# Patient Record
Sex: FEMALE | Race: WHITE | NOT HISPANIC OR LATINO | Employment: UNEMPLOYED | RURAL
[De-identification: names, ages, dates, MRNs, and addresses within clinical notes are randomized per-mention and may not be internally consistent; named-entity substitution may affect disease eponyms.]

---
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
## 2024-01-09 ENCOUNTER — TELEPHONE (OUTPATIENT)
Dept: FAMILY MEDICINE CLINIC | Facility: CLINIC | Age: 19
End: 2024-01-09
Payer: COMMERCIAL

## 2024-01-09 RX ORDER — AZITHROMYCIN 250 MG/1
TABLET, FILM COATED ORAL
Qty: 6 TABLET | Refills: 0 | Status: SHIPPED | OUTPATIENT
Start: 2024-01-09

## 2024-01-23 ENCOUNTER — TELEPHONE (OUTPATIENT)
Dept: FAMILY MEDICINE CLINIC | Facility: CLINIC | Age: 19
End: 2024-01-23
Payer: COMMERCIAL

## 2024-01-23 NOTE — TELEPHONE ENCOUNTER
Caller: Rachana Wolfe    Relationship to patient: Mother    Best call back number:  [phone]     Chief complaint: [facility] [employer]    Type of visit: NURSE/MA

## 2024-01-24 DIAGNOSIS — Z00.00 WELLNESS EXAMINATION: Primary | ICD-10-CM

## 2024-01-25 LAB
AMPHETAMINES UR QL SCN: NEGATIVE NG/ML
BARBITURATES UR QL SCN: NEGATIVE NG/ML
BENZODIAZ UR QL SCN: NEGATIVE NG/ML
BZE UR QL SCN: NEGATIVE NG/ML
CANNABINOIDS UR QL SCN: NEGATIVE NG/ML
CREAT UR-MCNC: 88.6 MG/DL (ref 20–300)
LABORATORY COMMENT REPORT: NORMAL
METHADONE UR QL SCN: NEGATIVE NG/ML
OPIATES UR QL SCN: NEGATIVE NG/ML
OXYCODONE+OXYMORPHONE UR QL SCN: NEGATIVE NG/ML
PCP UR QL: NEGATIVE NG/ML
PH UR: 7.8 [PH] (ref 4.5–8.9)
PROPOXYPH UR QL SCN: NEGATIVE NG/ML

## 2024-02-12 ENCOUNTER — TELEPHONE (OUTPATIENT)
Dept: NEUROLOGY | Facility: HOSPITAL | Age: 19
End: 2024-02-12

## 2024-02-13 ENCOUNTER — HOSPITAL ENCOUNTER (OUTPATIENT)
Dept: NEUROLOGY | Facility: HOSPITAL | Age: 19
Discharge: HOME | End: 2024-02-13
Admitting: FAMILY MEDICINE
Payer: COMMERCIAL

## 2024-02-13 DIAGNOSIS — R20.0 LEG NUMBNESS: ICD-10-CM

## 2024-02-13 PROCEDURE — 95911 NRV CNDJ TEST 9-10 STUDIES: CPT

## 2024-02-13 PROCEDURE — 95886 MUSC TEST DONE W/N TEST COMP: CPT

## 2024-03-22 ENCOUNTER — TELEPHONE (OUTPATIENT)
Dept: FAMILY MEDICINE CLINIC | Facility: CLINIC | Age: 19
End: 2024-03-22

## 2024-03-22 RX ORDER — AMOXICILLIN AND CLAVULANATE POTASSIUM 875; 125 MG/1; MG/1
1 TABLET, FILM COATED ORAL 2 TIMES DAILY
Qty: 20 TABLET | Refills: 0 | Status: SHIPPED | OUTPATIENT
Start: 2024-03-22 | End: 2024-04-01

## 2024-03-22 NOTE — TELEPHONE ENCOUNTER
Caller: Rachana Wolfe    Relationship: Mother    Best call back number: [phone]    What medication are you requesting:     PCP RECOMMENDATION    What are your current symptoms:     LITTLE COUGH MOSTLY FROM DRAINAGE, GREEN DRAINAGE, SINUS HEADACHE, EYES BLOOD SHOT    OTC NOT WORKING    How long have you been experiencing symptoms:     5 DAYS    Have you had these symptoms before:    [x] Yes  [] No    Have you been treated for these symptoms before:   [x] Yes  [] No    If a prescription is needed, what is your preferred pharmacy and phone number:      [facility] - [place] - [address] [facility] [phone] [facility] [phone] FX

## 2024-09-26 ENCOUNTER — TELEPHONE (OUTPATIENT)
Dept: FAMILY MEDICINE CLINIC | Facility: CLINIC | Age: 19
End: 2024-09-26
Payer: COMMERCIAL

## 2024-09-26 RX ORDER — SERTRALINE HYDROCHLORIDE 25 MG/1
25 TABLET, FILM COATED ORAL DAILY
Qty: 30 TABLET | Refills: 0 | Status: SHIPPED | OUTPATIENT
Start: 2024-09-26

## 2024-10-09 ENCOUNTER — OFFICE VISIT (OUTPATIENT)
Dept: FAMILY MEDICINE CLINIC | Facility: CLINIC | Age: 19
End: 2024-10-09
Payer: COMMERCIAL

## 2024-10-09 VITALS
DIASTOLIC BLOOD PRESSURE: 68 MMHG | OXYGEN SATURATION: 98 % | WEIGHT: 194.8 LBS | HEIGHT: 63 IN | SYSTOLIC BLOOD PRESSURE: 100 MMHG | BODY MASS INDEX: 34.52 KG/M2 | TEMPERATURE: 97.6 F | HEART RATE: 72 BPM

## 2024-10-09 DIAGNOSIS — Z00.00 WELLNESS EXAMINATION: ICD-10-CM

## 2024-10-09 DIAGNOSIS — F33.0 MILD EPISODE OF RECURRENT MAJOR DEPRESSIVE DISORDER: Primary | ICD-10-CM

## 2024-10-09 RX ORDER — ESCITALOPRAM OXALATE 10 MG/1
10 TABLET ORAL DAILY
Qty: 30 TABLET | Refills: 5 | Status: SHIPPED | OUTPATIENT
Start: 2024-10-09

## 2024-10-09 NOTE — PROGRESS NOTES
"Chief Complaint  Med Refill (zoloft)    Subjective      Safia Cao presents to [facility]  HPI: Patient is a 18 y.o. female who is here today with complaint of depression/anxiety, reports depression is worse than anxiety. Was prescribed Zoloft and experienced dizziness, nausea, indigestion. She did take the medication for 30 days and side effects did not resolve. Also needs simple physical for work, she is a .     Objective   Vital Signs:  /68 (BP Location: Left arm, Patient Position: Sitting, Cuff Size: Large Adult)   Pulse 72   Temp 97.6 °F (36.4 °C) (Infrared)   Ht 160 cm (63\") Comment: Patient supplied  Wt 88.4 kg (194 lb 12.8 oz)   SpO2 98%   BMI 34.51 kg/m²   Estimated body mass index is 34.51 kg/m² as calculated from the following:    Height as of this encounter: 160 cm (63\").    Weight as of this encounter: 88.4 kg (194 lb 12.8 oz).    Pediatric BMI = 96 %ile (Z= 1.80) based on CDC (Girls, 2-20 Years) BMI-for-age based on BMI available as of 10/9/2024.. BMI is >= 30 and <35. (Class 1 Obesity). The following options were offered after discussion;: weight loss educational material (shared in after visit summary)      Physical Exam  Constitutional:       Appearance: Normal appearance. She is not ill-appearing.   HENT:      Right Ear: Tympanic membrane normal.      Left Ear: Tympanic membrane normal.      Mouth/Throat:      Pharynx: No pharyngeal swelling or posterior oropharyngeal erythema.   Eyes:      Pupils: Pupils are equal, round, and reactive to light.   Cardiovascular:      Rate and Rhythm: Normal rate and regular rhythm.   Pulmonary:      Effort: Pulmonary effort is normal.      Breath sounds: Normal breath sounds.   Lymphadenopathy:      Cervical: No cervical adenopathy.   Neurological:      Mental Status: She is alert.   Psychiatric:         Mood and Affect: Mood normal.         Thought Content: Thought content does not include " suicidal ideation.        Result Review :  The following labs/imaging/notes were reviewed and discussed with the patient by RICK Marsh on 10/09/2024:             Assessment and Plan   Diagnoses and all orders for this visit:    1. Mild episode of recurrent major depressive disorder (Primary)  Comments:  Will start Lexapro, advised to follow-up in 1 month  Orders:  -     escitalopram (Lexapro) 10 MG tablet; Take 1 tablet by mouth Daily.  Dispense: 30 tablet; Refill: 5    2. Wellness examination  Comments:  cleared of communicable diseases               Follow Up  Return in about 1 month (around 11/9/2024).  Patient was given instructions and counseling regarding her condition or for health maintenance advice. Please see specific information pulled into the AVS if appropriate.

## 2024-11-08 ENCOUNTER — OFFICE VISIT (OUTPATIENT)
Dept: FAMILY MEDICINE CLINIC | Facility: CLINIC | Age: 19
End: 2024-11-08
Payer: COMMERCIAL

## 2024-11-08 VITALS
HEIGHT: 63 IN | OXYGEN SATURATION: 98 % | BODY MASS INDEX: 33.84 KG/M2 | DIASTOLIC BLOOD PRESSURE: 72 MMHG | HEART RATE: 56 BPM | SYSTOLIC BLOOD PRESSURE: 118 MMHG | TEMPERATURE: 97.3 F | WEIGHT: 191 LBS

## 2024-11-08 DIAGNOSIS — F41.9 ANXIETY: ICD-10-CM

## 2024-11-08 DIAGNOSIS — F33.0 MILD EPISODE OF RECURRENT MAJOR DEPRESSIVE DISORDER: Primary | ICD-10-CM

## 2024-11-08 DIAGNOSIS — F33.0 MILD EPISODE OF RECURRENT MAJOR DEPRESSIVE DISORDER: ICD-10-CM

## 2024-11-08 RX ORDER — ESCITALOPRAM OXALATE 10 MG/1
10 TABLET ORAL DAILY
Qty: 30 TABLET | Refills: 2 | Status: SHIPPED | OUTPATIENT
Start: 2024-11-08

## 2024-11-08 NOTE — PROGRESS NOTES
"Chief Complaint  Follow-up (Depression)    Subjective      Safia Cao presents to [facility]  HPI: Patient is a 18 y.o. female who is here today for follow up after starting Lexapro for anxiety/depression. Was previously on Zoloft which made her feel bad. Today she reports she feels better and she is tolerating medication with no side effects.      Objective   Vital Signs:  /72   Pulse 56   Temp 97.3 °F (36.3 °C)   Ht 160 cm (63\")   Wt 86.6 kg (191 lb)   SpO2 98%   BMI 33.83 kg/m²   Estimated body mass index is 33.83 kg/m² as calculated from the following:    Height as of this encounter: 160 cm (63\").    Weight as of this encounter: 86.6 kg (191 lb).            Physical Exam  Constitutional:       Appearance: Normal appearance.   Cardiovascular:      Rate and Rhythm: Normal rate and regular rhythm.   Pulmonary:      Effort: Pulmonary effort is normal.      Breath sounds: Normal breath sounds.   Neurological:      Mental Status: She is alert.   Psychiatric:         Mood and Affect: Mood normal.         Thought Content: Thought content does not include suicidal ideation.        Result Review :  The following labs/imaging/notes were reviewed and discussed with the patient by RICK Marsh on 11/08/2024:             Assessment and Plan   Diagnoses and all orders for this visit:    1. Mild episode of recurrent major depressive disorder (Primary)  -     escitalopram (Lexapro) 10 MG tablet; Take 1 tablet by mouth Daily.  Dispense: 30 tablet; Refill: 2    2. Anxiety    3. Mild episode of recurrent major depressive disorder  Comments:  Will start Lexapro, advised to follow-up in 1 month  Orders:  -     escitalopram (Lexapro) 10 MG tablet; Take 1 tablet by mouth Daily.  Dispense: 30 tablet; Refill: 2      Stable at this time. Continue Lexapro and follow up in 3 months or sooner if needed.          Follow Up  Return in about 3 months (around 2/8/2025) for Follow up " Lexapro.  Patient was given instructions and counseling regarding her condition or for health maintenance advice. Please see specific information pulled into the AVS if appropriate.

## 2024-11-27 ENCOUNTER — TELEPHONE (OUTPATIENT)
Dept: FAMILY MEDICINE CLINIC | Facility: CLINIC | Age: 19
End: 2024-11-27

## 2024-11-27 NOTE — TELEPHONE ENCOUNTER
Patient will need an appointment for medication to be prescribed. Advise Urgent care with holiday weekend coming and no openings

## 2024-11-27 NOTE — TELEPHONE ENCOUNTER
Caller: Safia Cao    Relationship to patient: Self    Best call back number: [phone]     Patient is needing: PATIENT SAID SHE KNOWS SHE HAS A SINUS INFECTION, ASKED IF SHE COULD GET A Z IRVIN, LIKE SHE HAS BEFORE. PLEASE CALL PATIENT TO LET HER KNOW IF ONE IS BEING SENT.

## 2024-12-03 ENCOUNTER — OFFICE VISIT (OUTPATIENT)
Dept: FAMILY MEDICINE CLINIC | Facility: CLINIC | Age: 19
End: 2024-12-03
Payer: COMMERCIAL

## 2024-12-03 VITALS
HEIGHT: 63 IN | BODY MASS INDEX: 33.49 KG/M2 | HEART RATE: 77 BPM | SYSTOLIC BLOOD PRESSURE: 116 MMHG | OXYGEN SATURATION: 97 % | DIASTOLIC BLOOD PRESSURE: 62 MMHG | WEIGHT: 189 LBS

## 2024-12-03 DIAGNOSIS — J01.10 ACUTE FRONTAL SINUSITIS, RECURRENCE NOT SPECIFIED: Primary | ICD-10-CM

## 2024-12-03 LAB
EXPIRATION DATE: NORMAL
EXPIRATION DATE: NORMAL
FLUAV AG UPPER RESP QL IA.RAPID: NOT DETECTED
FLUBV AG UPPER RESP QL IA.RAPID: NOT DETECTED
INTERNAL CONTROL: NORMAL
INTERNAL CONTROL: NORMAL
Lab: NORMAL
Lab: NORMAL
S PYO AG THROAT QL: NEGATIVE
SARS-COV-2 AG UPPER RESP QL IA.RAPID: NOT DETECTED

## 2024-12-03 PROCEDURE — 87428 SARSCOV & INF VIR A&B AG IA: CPT

## 2024-12-03 PROCEDURE — 87880 STREP A ASSAY W/OPTIC: CPT

## 2024-12-03 PROCEDURE — 99214 OFFICE O/P EST MOD 30 MIN: CPT

## 2024-12-03 NOTE — PROGRESS NOTES
"Chief Complaint  Cough (Pt presents today with cough and congestion that began a week ago.  Pt states when it first started she had fever and body aches.  Pt states sore throat began 2 days ago.)    Subjective      Safia Cao presents to [facility]  History of Present Illness  The patient is a 19-year-old female who presents today due to concerns about cough, sore throat, and congestion.    She has been experiencing significant congestion and productive coughing for the past 1.5 weeks. Despite some improvement, she continues to experience severe coughing. She recalls an episode of fever on the first day of her illness but has not had any febrile episodes since then. Her symptoms were particularly severe during the Thanksgiving holiday, necessitating rest and limiting her outdoor activities. However, upon resuming work, her symptoms have worsened. She works in a pre- setting. Over-the-counter medications such as generic Advil Cold and Sinus have provided minimal relief. She also attempted self-treatment with DayQuil and natural remedies over the weekend, which resulted in slight improvement.    SOCIAL HISTORY  She works in a pre- setting.    ALLERGIES  The patient has no known allergies.    MEDICATIONS  Current: Advil Cold and Sinus, DayQuil      Objective   Vital Signs:  /62   Pulse 77   Ht 160 cm (63\")   Wt 85.7 kg (189 lb)   SpO2 97%   BMI 33.48 kg/m²   Estimated body mass index is 33.48 kg/m² as calculated from the following:    Height as of this encounter: 160 cm (63\").    Weight as of this encounter: 85.7 kg (189 lb).            Physical Exam     Physical Exam        Result Review :  The following labs/imaging/notes were reviewed and discussed with the patient by Fabio Layne MD on 12/03/2024:            Assessment      Diagnoses and all orders for this visit:    1. Acute frontal sinusitis, recurrence not specified (Primary)    Other " orders  -     amoxicillin-clavulanate (AUGMENTIN) 875-125 MG per tablet; Take 1 tablet by mouth 2 (Two) Times a Day.  Dispense: 14 tablet; Refill: 0             Assessment & Plan  1. Bacterial sinusitis.  Her symptoms initially suggested a viral etiology, but the persistence and worsening of symptoms indicate a possible bacterial superinfection. COVID-19 and influenza tests were negative. Augmentin will be taken twice daily with food for 7 days. She is advised to take a probiotic or yogurt to mitigate potential gastrointestinal side effects such as diarrhea. For congestion and mucus thickness, Mucinex DM (guaifenesin 1200 mg with dextromethorphan) is recommended twice daily. Adequate hydration is emphasized to help thin mucus secretions. Tylenol can be used for aches and pains. If there is no improvement after completing the antibiotic course, she should inform the clinic.    Follow-up  The patient will follow up in February 2025.    No orders of the defined types were placed in this encounter.      Follow Up   No follow-ups on file.  Patient was given instructions and counseling regarding her condition or for health maintenance advice. Please see specific information pulled into the AVS if appropriate.         Patient or patient representative verbalized consent for the use of Ambient Listening during the visit with  Fabio Layne MD for chart documentation. 12/3/2024  11:26 CST

## 2025-01-22 DIAGNOSIS — F33.0 MILD EPISODE OF RECURRENT MAJOR DEPRESSIVE DISORDER: ICD-10-CM

## 2025-01-23 RX ORDER — ESCITALOPRAM OXALATE 10 MG/1
10 TABLET ORAL DAILY
Qty: 30 TABLET | Refills: 2 | Status: SHIPPED | OUTPATIENT
Start: 2025-01-23

## 2025-02-12 ENCOUNTER — OFFICE VISIT (OUTPATIENT)
Dept: FAMILY MEDICINE CLINIC | Facility: CLINIC | Age: 20
End: 2025-02-12
Payer: COMMERCIAL

## 2025-02-12 VITALS
SYSTOLIC BLOOD PRESSURE: 120 MMHG | OXYGEN SATURATION: 98 % | BODY MASS INDEX: 33.31 KG/M2 | HEART RATE: 56 BPM | HEIGHT: 63 IN | WEIGHT: 188 LBS | DIASTOLIC BLOOD PRESSURE: 74 MMHG

## 2025-02-12 DIAGNOSIS — F32.5 DEPRESSION, MAJOR, IN REMISSION: Primary | ICD-10-CM

## 2025-02-12 DIAGNOSIS — F41.9 ANXIETY: ICD-10-CM

## 2025-02-12 DIAGNOSIS — M54.41 CHRONIC MIDLINE LOW BACK PAIN WITH BILATERAL SCIATICA: ICD-10-CM

## 2025-02-12 DIAGNOSIS — G89.29 CHRONIC MIDLINE LOW BACK PAIN WITH BILATERAL SCIATICA: ICD-10-CM

## 2025-02-12 DIAGNOSIS — M54.42 CHRONIC MIDLINE LOW BACK PAIN WITH BILATERAL SCIATICA: ICD-10-CM

## 2025-02-12 PROCEDURE — 99214 OFFICE O/P EST MOD 30 MIN: CPT | Performed by: NURSE PRACTITIONER

## 2025-02-12 RX ORDER — METHYLPREDNISOLONE 4 MG/1
TABLET ORAL
Qty: 1 EACH | Refills: 0 | Status: SHIPPED | OUTPATIENT
Start: 2025-02-12

## 2025-02-12 RX ORDER — ESCITALOPRAM OXALATE 10 MG/1
10 TABLET ORAL DAILY
Qty: 30 TABLET | Refills: 2 | Status: SHIPPED | OUTPATIENT
Start: 2025-02-12

## 2025-02-12 NOTE — PROGRESS NOTES
"Chief Complaint  Follow-up and Back Pain    Subjective      Safia Cao presents to [facility]  HPI: Patient is a 19 y.o. female who is here today for follow up to evaluate Lexapro. Reports medication is still working well for her. She complains of low back pain to center of back. Reports approximately 1 year ago she was playing basketball and was \"ran over\" by another girl. She fell hard onto her back and it has hurt ever since, but pain is getting worse. Rates pain on average 6/10 with worse pain at times. Pain radiates down bilateral legs. There has been 2 times in the last year that legs went completely numb and caused her to fall. She does go to the chiropractor which provides short term relief for 2-3 days.     Objective   Vital Signs:  /74   Pulse 56   Ht 160 cm (63\")   Wt 85.3 kg (188 lb)   SpO2 98%   BMI 33.30 kg/m²   Estimated body mass index is 33.3 kg/m² as calculated from the following:    Height as of this encounter: 160 cm (63\").    Weight as of this encounter: 85.3 kg (188 lb).            Physical Exam  Constitutional:       Appearance: Normal appearance.   Cardiovascular:      Rate and Rhythm: Normal rate and regular rhythm.   Pulmonary:      Effort: Pulmonary effort is normal.      Breath sounds: Normal breath sounds.   Musculoskeletal:      Lumbar back: Bony tenderness present. Normal range of motion (painful ROM with forward extension). Negative right straight leg raise test and negative left straight leg raise test. No scoliosis.   Neurological:      Mental Status: She is alert.   Psychiatric:         Mood and Affect: Mood normal.        Result Review :  The following labs/imaging/notes were reviewed and discussed with the patient by RICK Marsh on 02/12/2025:             Assessment and Plan   Diagnoses and all orders for this visit:    1. Depression, major, in remission (Primary)  -     escitalopram (LEXAPRO) 10 MG tablet; Take 1 tablet by " mouth Daily.  Dispense: 30 tablet; Refill: 2    2. Anxiety    3. Chronic midline low back pain with bilateral sciatica  -     XR Spine Lumbar 2 or 3 View; Future  -     methylPREDNISolone (MEDROL) 4 MG dose pack; Take as directed on package instructions.  Dispense: 1 each; Refill: 0      Stable on Lexapro, continue at current dose. Refills sent. Follow up with me every 6 months for anxiety/depression. I have sent oral steroid to hopefully help reduce inflammation and help with pain. I have also ordered lumbar x-ray, depending on results will decide next step. Advised to ice area 3-4 times daily and take Ibuprofen as needed for pain.          Follow Up  No follow-ups on file.  Patient was given instructions and counseling regarding her condition or for health maintenance advice. Please see specific information pulled into the AVS if appropriate.

## 2025-02-21 ENCOUNTER — HOSPITAL ENCOUNTER (OUTPATIENT)
Dept: GENERAL RADIOLOGY | Facility: HOSPITAL | Age: 20
Discharge: HOME | End: 2025-02-21
Admitting: NURSE PRACTITIONER
Payer: COMMERCIAL

## 2025-02-21 DIAGNOSIS — G89.29 CHRONIC MIDLINE LOW BACK PAIN WITH BILATERAL SCIATICA: ICD-10-CM

## 2025-02-21 DIAGNOSIS — M54.41 CHRONIC MIDLINE LOW BACK PAIN WITH BILATERAL SCIATICA: ICD-10-CM

## 2025-02-21 DIAGNOSIS — M54.42 CHRONIC MIDLINE LOW BACK PAIN WITH BILATERAL SCIATICA: ICD-10-CM

## 2025-02-21 PROCEDURE — 72100 X-RAY EXAM L-S SPINE 2/3 VWS: CPT

## 2025-02-24 ENCOUNTER — TELEPHONE (OUTPATIENT)
Dept: FAMILY MEDICINE CLINIC | Facility: CLINIC | Age: 20
End: 2025-02-24
Payer: COMMERCIAL

## 2025-02-24 DIAGNOSIS — M54.41 CHRONIC MIDLINE LOW BACK PAIN WITH BILATERAL SCIATICA: Primary | ICD-10-CM

## 2025-02-24 DIAGNOSIS — M54.42 CHRONIC MIDLINE LOW BACK PAIN WITH BILATERAL SCIATICA: Primary | ICD-10-CM

## 2025-02-24 DIAGNOSIS — G89.29 CHRONIC MIDLINE LOW BACK PAIN WITH BILATERAL SCIATICA: Primary | ICD-10-CM

## 2025-03-03 ENCOUNTER — OFFICE VISIT (OUTPATIENT)
Age: 20
End: 2025-03-03
Payer: COMMERCIAL

## 2025-03-03 VITALS — BODY MASS INDEX: 33.31 KG/M2 | WEIGHT: 188 LBS | HEIGHT: 63 IN

## 2025-03-03 DIAGNOSIS — R20.0 BILATERAL LEG NUMBNESS: ICD-10-CM

## 2025-03-03 DIAGNOSIS — M54.50 CHRONIC BILATERAL LOW BACK PAIN WITHOUT SCIATICA: Primary | ICD-10-CM

## 2025-03-03 DIAGNOSIS — G89.29 CHRONIC BILATERAL LOW BACK PAIN WITHOUT SCIATICA: Primary | ICD-10-CM

## 2025-03-03 PROCEDURE — 99204 OFFICE O/P NEW MOD 45 MIN: CPT | Performed by: STUDENT IN AN ORGANIZED HEALTH CARE EDUCATION/TRAINING PROGRAM

## 2025-03-03 RX ORDER — METHOCARBAMOL 750 MG/1
750 TABLET, FILM COATED ORAL NIGHTLY PRN
Qty: 20 TABLET | Refills: 0 | Status: SHIPPED | OUTPATIENT
Start: 2025-03-03

## 2025-03-03 NOTE — PROGRESS NOTES
[facility]  Nate Gomez MD, PhD  Delvin Gomez PA-C    CHIEF COMPLAINT  Initial Evaluation of the Lumbar Spine (Patient presents today for lower back pain with bilateral sciatica. X-ray performed at  on 2/21/25. Patient states constant pain started about a year ago. Numbness and tingling down both legs comes and goes. Has tried going to chiropractor, but only helps temporarily. )       HISTORY OF PRESENT ILLNESS    History of Present Illness  The patient is a 19-year-old female, new patient here with back pain.    She reports experiencing persistent back pain following an incident in January 2024 playing college basketball, during which she hyperextended her back while attempting to take a charge in a college basketball game. Despite not participating in the current season, she continues to experience discomfort. Over the past year, she has sought chiropractic treatment, which provided temporary relief for 2 to 3 days. However, the pain has become more severe over the last 3 to 4 months, disrupting her sleep. She also reports two episodes of sudden loss of sensation in both legs during intense running. During her basketball season, she experienced constant intense leg pain, which has since subsided due to her cessation of the sport. 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 The pain is typically intensifies throughout the day, peaking at night. She avoids medication unless the pain is severe, preferring to use Biofreeze patches, heat, and ice for relief.  She has been managing her symptoms with stretching exercises.  A Medrol Dosepak was prescribed to alleviate inflammation, which provided some relief but was not long-lasting.    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       HISTORY    Current Outpatient Medications   Medication Instructions     escitalopram (LEXAPRO) 10 mg, Oral, Daily    methocarbamol (ROBAXIN) 750 mg, Oral, Nightly PRN         reports that she has never smoked. She has been exposed to tobacco smoke. She has never used smokeless tobacco. She reports that she does not drink alcohol and does not use drugs.    Past Medical History:   Diagnosis Date    ADHD (attention deficit hyperactivity disorder) 2008    Allergic     Asthma     Heart murmur     Hyperlipidemia         Past Surgical History:   Procedure Laterality Date    TONSILLECTOMY      TYMPANOSTOMY TUBE PLACEMENT          PHYSICAL EXAM  Constitutional: The patient is in no apparent distress and generally well-appearing. The patient hears me clearly and answers questions appropriately.   Musculoskeletal:  Bilateral HIP   ---Inspection: normal gait, patient does not appear to be in visible distress, sitting normally, no obvious wasting of muscles   ---Palpation:   TENDER:   Unless otherwise noted, patient is NON-tender at greater trochanter, gluteal muscles/piriformis, IT band/TFL, ASIS (sartorius), iliac crest, lumbar paraspinal muscles, midline lumbar spine   ---ROM: passive hip flexion to ~120 degrees, symmetric abduction/adduction and internal/external rotation   ---Strength: 5/5 seated hip flexion, 5/5 hip abduction, 5/5 hip adduction.   ---Special tests   Negative log roll test   Negative FADIR   Negative YAKELIN   Negative straight leg raise  Physical Exam  Hip mobility is solid. Hip joints are normal.      IMAGING    XR Spine Lumbar 2 or 3 View    Result Date: 2/21/2025  Narrative: EXAM: XR SPINE LUMBAR 2 OR 3 VW-  DATE: 2/21/2025 12:33 PM  HISTORY: lumbar spine pain ongoing for 1 year; M54.41-Lumbago with sciatica, right side; M54.42-Lumbago with sciatica, left side; G89.29-Other chronic pain   COMPARISON: None available.  TECHNIQUE: Frontal and lateral views were obtained. 3.0 images.   FINDINGS:  Mineralization is normal. No acute fracture or spondylolisthesis is identified.  There is no significant spondylosis or facet arthropathy. There is moderate stool in the colon.       Impression:  1. Unremarkable lumbar spine. 2. Moderate stool in the colon.  This report was signed and finalized on 2/21/2025 2:08 PM by Félix Huber.        Results  Imaging  X-rays above personally reviewed and interpreted.   Lumbar x-rays show no significant abnormalities, no stress fracture.       ASSESSMENT & PLAN  Diagnoses and all orders for this visit:    1. Chronic bilateral low back pain without sciatica (Primary)  -     MRI Lumbar Spine Without Contrast; Future  -     Ambulatory Referral to Physical Therapy for Evaluation & Treatment  -     methocarbamol (ROBAXIN) 750 MG tablet; Take 1 tablet by mouth At Night As Needed for Muscle Spasms.  Dispense: 20 tablet; Refill: 0    2. Bilateral leg numbness  -     MRI Lumbar Spine Without Contrast; Future  -     Ambulatory Referral to Physical Therapy for Evaluation & Treatment  -     methocarbamol (ROBAXIN) 750 MG tablet; Take 1 tablet by mouth At Night As Needed for Muscle Spasms.  Dispense: 20 tablet; Refill: 0    Patient has had low back pain a little over 1 year that he either started with or was at least exacerbated by an injury while playing college basketball.  Her symptoms sound more like they are coming from her lumbar spine than from her hips.  She was formally college  although she is not playing this year.  For her nighttime symptoms I am adding on Robaxin.  I am also referring her to physical therapy.  However because of the duration of her symptoms, her lack of improvement with conservative measures including anti-inflammatories and home exercise therapy, as well as the couple of episodes of acute bilateral leg numbness while exercising, I do think we should move onto getting an MRI of her lumbar spine as well.  Will be important to evaluate for any nerve root impingement, disc herniations, or underlying myelopathy.      MRI  lumbar spine  PT referral  Methocarbamol prescription  Follow up: Pending MRI results        Patient or patient representative verbalized consent for the use of Ambient Listening during the visit with  Nate Gomez MD for chart documentation. 3/3/2025  16:25 CST    Nate Gomez MD, PhD

## 2025-04-16 ENCOUNTER — RESULTS FOLLOW-UP (OUTPATIENT)
Age: 20
End: 2025-04-16
Payer: COMMERCIAL

## 2025-04-16 ENCOUNTER — HOSPITAL ENCOUNTER (OUTPATIENT)
Dept: MRI IMAGING | Facility: HOSPITAL | Age: 20
Discharge: HOME | End: 2025-04-16
Admitting: STUDENT IN AN ORGANIZED HEALTH CARE EDUCATION/TRAINING PROGRAM
Payer: COMMERCIAL

## 2025-04-16 DIAGNOSIS — M54.50 CHRONIC BILATERAL LOW BACK PAIN WITHOUT SCIATICA: ICD-10-CM

## 2025-04-16 DIAGNOSIS — R20.0 BILATERAL LEG NUMBNESS: ICD-10-CM

## 2025-04-16 DIAGNOSIS — G89.29 CHRONIC BILATERAL LOW BACK PAIN WITHOUT SCIATICA: ICD-10-CM

## 2025-04-16 PROCEDURE — 72148 MRI LUMBAR SPINE W/O DYE: CPT

## 2025-04-21 ENCOUNTER — OFFICE VISIT (OUTPATIENT)
Age: 20
End: 2025-04-21
Payer: COMMERCIAL

## 2025-04-21 VITALS — HEIGHT: 63 IN | WEIGHT: 188 LBS | BODY MASS INDEX: 33.31 KG/M2 | RESPIRATION RATE: 18 BRPM

## 2025-04-21 DIAGNOSIS — G89.29 CHRONIC BILATERAL LOW BACK PAIN WITHOUT SCIATICA: Primary | ICD-10-CM

## 2025-04-21 DIAGNOSIS — M54.50 CHRONIC BILATERAL LOW BACK PAIN WITHOUT SCIATICA: Primary | ICD-10-CM

## 2025-04-21 PROCEDURE — 99213 OFFICE O/P EST LOW 20 MIN: CPT | Performed by: STUDENT IN AN ORGANIZED HEALTH CARE EDUCATION/TRAINING PROGRAM

## 2025-04-21 NOTE — PROGRESS NOTES
[facility]  Nate Gomez MD, PhD  Delvin Gomez PA-C    CHIEF COMPLAINT  Follow-up of the Lumbar Spine (Patient presents today for lumbar spine MRI results. MRI performed at [facility] on 4/16/25. )       HISTORY OF PRESENT ILLNESS    History of Present Illness  The patient is a 19-year-old female who presents for follow-up on her lumbar spine MRI.    She reports persistent mid to lower back pain, which remains unchanged since her last visit. No new episodes of leg instability have been experienced. The pain intensifies upon forward flexion and is alleviated when leaning backward. She has not undergone physical therapy specifically for her back. Currently, she is employed full-time and attends night school with the aspiration to become an EMT.       HISTORY    Current Outpatient Medications   Medication Instructions    escitalopram (LEXAPRO) 10 mg, Oral, Daily    methocarbamol (ROBAXIN) 750 mg, Oral, Nightly PRN         reports that she has never smoked. She has been exposed to tobacco smoke. She has never used smokeless tobacco. She reports that she does not drink alcohol and does not use drugs.    Past Medical History:   Diagnosis Date    ADHD (attention deficit hyperactivity disorder) 2008    Allergic     Asthma     Heart murmur     Hyperlipidemia         Past Surgical History:   Procedure Laterality Date    TONSILLECTOMY      TYMPANOSTOMY TUBE PLACEMENT          PHYSICAL EXAM  Constitutional: The patient is in no apparent distress and generally well-appearing. The patient hears me clearly and answers questions appropriately.   Musculoskeletal:  L-spine:  Seated normally  Normal gait  Increased pain with forward flexion, no pain with extension  Physical Exam        IMAGING    MRI Lumbar Spine Without Contrast  Result Date: 4/16/2025  Narrative: MRI LUMBAR SPINE WO CONTRAST- 4/16/2025 2:49 PM  HISTORY: Chronic low back pain >1 year; 2 episodes of legs giving out while  running; not improved with meds, home PT; M54.50-Low back pain, unspecified; G89.29-Other chronic pain; R20.0-Anesthesia of skin  COMPARISON: None  TECHNIQUE: Multiplanar, multisequence MRI of the lumbar spine was performed without the use of contrast.  FINDINGS:  Alignment: There are presumed to be 5 lumbar-type vertebrae with the most inferior being labeled L5. Normal lumbar lordosis is maintained. There is no evidence of listhesis or subluxation. Disc space heights well maintained and well-hydrated on the more heavily T2 weighted images.  Marrow signal: No pathologic marrow infiltrate is demonstrated. The vertebral body heights and posterior elements are maintained.  Cord/Canal: The conus medullaris terminates at the level of L1. The visualized spinal cord is normal in signal and morphology.  Soft tissues: The surrounding soft tissues are unremarkable.  Levels:  L1-L2: No disc bulge is present. No significant neuroforaminal or central canal stenosis is seen.  L2-L3: No disc bulge is present. No significant neuroforaminal or central canal stenosis is seen.  L3-L4: No disc bulge is present. No significant neuroforaminal or central canal stenosis is seen.  L4-L5: No disc bulge is present. No significant neuroforaminal or central canal stenosis is seen.  L5-S1: No disc bulge is present. No significant neuroforaminal or central canal stenosis is seen.      Impression: 1. Normal MRI of the lumbar spine..    This report was signed and finalized on 4/16/2025 4:28 PM by Dr. Cosme Sin MD.         Results  Imaging   - Lumbar spine MRI: No structural abnormalities, no bulging disk or subluxation, and well-maintained disk heights.       ASSESSMENT & PLAN  Diagnoses and all orders for this visit:    1. Chronic bilateral low back pain without sciatica (Primary)  -     Ambulatory Referral to Physical Therapy for Evaluation & Treatment    Discussed patient's MRI results with her in the office today.  No abnormalities on  the lumbar spine MRI.  We discussed that there are still things in her low back that can generate pain that may not show up as structural abnormalities on MRI.  Such as muscular trigger points, myofascial pain, and even some things in the spine itself that are difficult to see on MRI.  However overall the imaging results are reassuring and I have recommended formal physical therapy.  Especially with her goal of becoming an EMT/paramedic.  Referral provided.  Also recommended that she read the book back  by Dr. Jag Paul, to help self educate about back disorders.  If she does not improve with PT by next that would be to have her seen by one of the neurosurgeons.      Referral to PT  Follow up: As needed        Patient or patient representative verbalized consent for the use of Ambient Listening during the visit with  Nate Gomez MD for chart documentation. 4/21/2025  16:31 CDT      This document has been signed by Nate Gomez MD on April 21, 2025 15:50 CDT

## 2025-05-17 DIAGNOSIS — F32.5 DEPRESSION, MAJOR, IN REMISSION: ICD-10-CM

## 2025-05-19 RX ORDER — ESCITALOPRAM OXALATE 10 MG/1
10 TABLET ORAL DAILY
Qty: 30 TABLET | Refills: 2 | Status: SHIPPED | OUTPATIENT
Start: 2025-05-19